# Patient Record
(demographics unavailable — no encounter records)

---

## 2025-02-13 NOTE — PHYSICAL EXAM
[de-identified] :   - On examination, the skin is intact around the left ankle. Range of motion is limited, with dorsiflexion at 10 degrees and plantar flexion at 15 degrees. Sensation is present to light touch. The [de-identified] : - New x-rays of the left ankle were obtained today, showing three views. The images demonstrate an intact mortise joint and appropriate healing of the lateral malleolus fracture. The ankle joint appears to be in good condition, with no signs of displacement or misalignment.

## 2025-02-13 NOTE — HISTORY OF PRESENT ILLNESS
[de-identified] : Summary: - 79-year-old female presenting for follow-up of left ankle fracture (lateral malleolus) with new x-rays and transition to CAM walker boot.  Chief Complaint (CC): - Follow-up for left ankle fracture (lateral malleolus)  History of Present Illness: - Halima Arora, a 79-year-old female, presents for follow-up of a left ankle fracture involving the lateral malleolus, which occurred on December 28th. The patient reports a complicated recovery course, including a severe bout of influenza that prolonged her stay in rehabilitation. She mentions having sciatica and rheumatoid arthritis, which may have contributed to her initial fall. The patient expresses frustration with her prolonged recovery and eagerness to return home. She notes difficulty in performing daily activities without assistance and is cautious about her recovery progress. The patient also mentions experiencing significant pain from her sciatica.

## 2025-02-13 NOTE — ASSESSMENT
[FreeTextEntry1] :   - Left Ankle Fracture (Lateral Malleolus): The patient's left ankle fracture is healing appropriately based on the new x-rays. The intact mortise joint and good alignment of the lateral malleolus fracture indicate satisfactory progress.     - Transition to CAM walker boot for improved mobility and protection     - Initiate range of motion exercises, including ankle circles and dorsiflexion/plantar flexion movements, even while in the splint     - Continue physical therapy to improve strength and mobility     - Follow-up appointment scheduled in 8 weeks with new x-rays to assess healing progress     - Patient education provided on proper use of the CAM walker boot and importance of compliance with therapy     - Advised patient that boot is not necessary when in bed     - Discussed potential for discharge from orthopedic care at the next appointment if progress is satisfactory